# Patient Record
Sex: FEMALE | Race: OTHER | HISPANIC OR LATINO | URBAN - METROPOLITAN AREA
[De-identification: names, ages, dates, MRNs, and addresses within clinical notes are randomized per-mention and may not be internally consistent; named-entity substitution may affect disease eponyms.]

---

## 2020-04-05 ENCOUNTER — EMERGENCY (EMERGENCY)
Facility: HOSPITAL | Age: 45
LOS: 1 days | Discharge: HOME | End: 2020-04-05
Payer: MEDICARE

## 2020-04-05 VITALS
SYSTOLIC BLOOD PRESSURE: 114 MMHG | HEART RATE: 70 BPM | TEMPERATURE: 98 F | WEIGHT: 134.92 LBS | DIASTOLIC BLOOD PRESSURE: 76 MMHG | OXYGEN SATURATION: 100 % | RESPIRATION RATE: 18 BRPM

## 2020-04-05 PROCEDURE — 99283 EMERGENCY DEPT VISIT LOW MDM: CPT

## 2020-04-05 PROCEDURE — 87635 SARS-COV-2 COVID-19 AMP PRB: CPT

## 2020-04-05 NOTE — ED PROVIDER NOTE - OBJECTIVE STATEMENT
44 year-old female, no significant PMHx, presents with cc flu-like symptoms for 3 days. Gradual onset of subjective fever, chills, body aches, mid-back pain, cough and diarrhea. Denies any chest pain, SOB, abdominal pain, urinary symptoms, rash, photophobia, neck stiffness or any other complaints.

## 2020-04-05 NOTE — ED PROVIDER NOTE - CLINICAL SUMMARY MEDICAL DECISION MAKING FREE TEXT BOX
Well-appearing, vitals stable. O2 sat WNL. No signs of distress. No DOMINGO. Will discharge with isolation precautions and strict return instructions. Questions answered.

## 2020-04-05 NOTE — ED PROVIDER NOTE - PATIENT PORTAL LINK FT
You can access the FollowMyHealth Patient Portal offered by [facility] by registering at the following website: [email or web address]. By joining [facility]’s FollowMyHealth portal, you will also be able to view your health information using other applications (apps) compatible with our system.

## 2020-04-05 NOTE — ED PROVIDER NOTE - NSFOLLOWUPINSTRUCTIONS_ED_ALL_ED_FT
Hoy puede o no ginny sido examinado para detectar el virus COVID-19. Tendrá que aislarse wagner los próximos 11 días y luego puede salir del aislamiento siempre y cuando tenga 3 días sin fiebre (sin alta ningún medicamento para la fiebre).    Para el dolor o la fiebre, puede alta: Tylenol 1000 mg por vía oral cada 6 horas, según sea necesario. (Santana 4000 mg en 24 horas).    Mantente crystal hidratado bebiendo mucha agua todos los días.    ** Regrese a la sabi de urgencias si comienza a tener dificultad para respirar, si omid síntomas empeoran o si le preocupa. De lo contrario, quédese en casa y aísle.    ** Si tiene amigos o familiares que tienen síntomas leves que pueden deberse al virus, dígales que se queden en casa    Quédese en casa: las personas que están levemente enfermas con COVID-19 pueden recuperarse en casa. No se vaya, excepto para obtener atención médica. No visitar áreas públicas.  Manténgase en contacto con rondon médico. Llame antes de recibir atención médica. Asegúrese de recibir atención si se siente peor o si rachna que es gurpreet emergencia.  Evite el transporte público: evite usar el transporte público, el transporte compartido o los taxis.  Manténgase alejado de los demás: tanto atra sea posible, debe permanecer en gurpreet "habitación para enfermos" específica y lejos de otras personas en rondon hogar. Use un baño separado, si está disponible.  Limite el contacto con mascotas y animales: debe restringir el contacto con mascotas y otros animales, pa tara lo haría con otras personas.  Aunque no silver habido informes de mascotas u otros animales que se enfermen con COVID-19, aún se recomienda que las personas con el virus limiten el contacto con los animales hasta que se conozca más información.  Si está enfermo: debe usar gurpreet mascarilla cuando esté cerca de otras personas y antes de ingresar al consultorio de un proveedor de atención médica.  Si está cuidando a otros: si la persona enferma no puede usar gurpreet máscara facial (por ejemplo, porque causa problemas para respirar), las personas que viven en el hogar deben permanecer en gurpreet habitación diferente. Cuando los cuidadores ingresan a la habitación de la persona enferma, deben usar gurpreet máscara facial. No se recomiendan visitantes, aparte de los cuidadores.  Cubierta: Cubra rondon boca y nariz con un pañuelo cuando tosa o estornude.  Eliminar: tirar los pañuelos usados ??en un bote de basura forrado.  Lávese las belinda: Lávese las belinda inmediatamente con agua y jabón wagner al menos 20 segundos. Si no hay agua y jabón disponibles, lávese las belinda con un desinfectante para belinda a base de alcohol que contenga al menos 60% de alcohol.  Desinfectante para belinda: si no hay agua y jabón disponibles, use un desinfectante para belinda a base de alcohol con al menos 60% de alcohol, cubriendo todas las superficies de omid belinda y frotándolas hasta que se sientan secas.  Jabón y agua: el jabón y el agua son la mejor opción, especialmente si las belinda están visiblemente sucias.  Evite tocar: evite tocarse los ojos, la nariz y la boca con las belinda sin honey.  No comparta: No comparta platos, vasos, vasos, utensilios para comer, toallas o ropa de cama con otras personas en rondon hogar.  Lávese crystal después del uso: después de usar estos artículos, lávelos crystal con agua y jabón o póngalos en el lavavajillas.  Limpie y desinfecte: Rutinariamente limpie las superficies de alto contacto en rondon "habitación para enfermos" y baño. Deje que otra persona limpie y desinfecte las superficies en las áreas comunes, marilyn no en rondon habitación y baño.  Si un cuidador u otra persona necesita limpiar y desinfectar la habitación o el baño de gurpreet persona enferma, debe hacerlo según sea necesario. El cuidador / otra persona debe usar gurpreet máscara y esperar el mayor tiempo posible después de que la persona enferma haya usado el baño.  Las superficies de alto contacto incluyen teléfonos, controles remotos, mostradores, mesas, pomos de cody, accesorios de baño, inodoros, teclados, tabletas y mesitas de noche.  Limpie y desinfecte las áreas que pueden tener aguila, heces o fluidos corporales.  Busque atención médica, marilyn llame jose antonio: busque atención médica de inmediato si rondon enfermedad está empeorando (por ejemplo, si tiene dificultad para respirar).  Llame a rondon médico antes de ingresar: Antes de ir al consultorio del médico o la sabi de emergencias, llame con anticipación y dígales omid síntomas. Te dirán qué hacer.  Use gurpreet máscara facial: si es posible, póngase gurpreet máscara facial antes de ingresar al edificio. Si no puede ponerse gurpreet máscara facial, trate de mantener gurpreet distancia soto de otras personas (al menos a 6 pies de distancia). [place] ayudará a proteger a las personas en la oficina o en la sabi de espera.  Siga las instrucciones de cuidado de rondon proveedor de atención médica y el departamento de [place]ke[place] local: las autoridades de hakeem locales le darán instrucciones sobre cómo controlar omid síntomas y reportar información.  Llame al 911 si tiene gurpreet emergencia médica: si tiene gurpreet emergencia médica y necesita llamar al 911, notifique al operador que tiene o rachna que podría tener COVID-19. Si es posible, póngase gurpreet mascarilla antes de que llegue la ayuda médica. v

## 2020-04-06 LAB — SARS-COV-2 RNA SPEC QL NAA+PROBE: DETECTED
